# Patient Record
Sex: MALE | Race: BLACK OR AFRICAN AMERICAN | Employment: FULL TIME | URBAN - METROPOLITAN AREA
[De-identification: names, ages, dates, MRNs, and addresses within clinical notes are randomized per-mention and may not be internally consistent; named-entity substitution may affect disease eponyms.]

---

## 2017-10-17 ENCOUNTER — APPOINTMENT (OUTPATIENT)
Dept: GENERAL RADIOLOGY | Facility: CLINIC | Age: 27
End: 2017-10-17
Attending: PHYSICIAN ASSISTANT

## 2017-10-17 ENCOUNTER — HOSPITAL ENCOUNTER (EMERGENCY)
Facility: CLINIC | Age: 27
Discharge: HOME | End: 2017-10-17
Attending: PHYSICIAN ASSISTANT | Admitting: PHYSICIAN ASSISTANT

## 2017-10-17 VITALS
SYSTOLIC BLOOD PRESSURE: 132 MMHG | HEIGHT: 71 IN | TEMPERATURE: 98.7 F | BODY MASS INDEX: 35.7 KG/M2 | RESPIRATION RATE: 16 BRPM | WEIGHT: 255 LBS | DIASTOLIC BLOOD PRESSURE: 90 MMHG | HEART RATE: 75 BPM | OXYGEN SATURATION: 97 %

## 2017-10-17 DIAGNOSIS — J45.901 EXACERBATION OF ASTHMA, UNSPECIFIED ASTHMA SEVERITY, UNSPECIFIED WHETHER PERSISTENT: ICD-10-CM

## 2017-10-17 DIAGNOSIS — J06.9 URI WITH COUGH AND CONGESTION: ICD-10-CM

## 2017-10-17 PROCEDURE — 99284 EMERGENCY DEPT VISIT MOD MDM: CPT | Mod: 25

## 2017-10-17 PROCEDURE — 25000125 ZZHC RX 250: Performed by: PHYSICIAN ASSISTANT

## 2017-10-17 PROCEDURE — 71020 XR CHEST 2 VW: CPT

## 2017-10-17 PROCEDURE — 94640 AIRWAY INHALATION TREATMENT: CPT

## 2017-10-17 RX ORDER — PREDNISONE 20 MG/1
40 TABLET ORAL ONCE
Status: COMPLETED | OUTPATIENT
Start: 2017-10-17 | End: 2017-10-17

## 2017-10-17 RX ORDER — ALBUTEROL SULFATE 0.83 MG/ML
1 SOLUTION RESPIRATORY (INHALATION) EVERY 6 HOURS PRN
Qty: 75 ML | Refills: 0 | Status: SHIPPED | OUTPATIENT
Start: 2017-10-17

## 2017-10-17 RX ORDER — ALBUTEROL SULFATE 90 UG/1
2 AEROSOL, METERED RESPIRATORY (INHALATION) EVERY 6 HOURS PRN
Qty: 1 INHALER | Refills: 0 | Status: SHIPPED | OUTPATIENT
Start: 2017-10-17

## 2017-10-17 RX ORDER — ALBUTEROL SULFATE 0.83 MG/ML
2.5 SOLUTION RESPIRATORY (INHALATION) ONCE
Status: COMPLETED | OUTPATIENT
Start: 2017-10-17 | End: 2017-10-17

## 2017-10-17 RX ORDER — PREDNISONE 20 MG/1
TABLET ORAL
Qty: 10 TABLET | Refills: 0 | Status: SHIPPED | OUTPATIENT
Start: 2017-10-17 | End: 2019-04-27

## 2017-10-17 RX ORDER — IPRATROPIUM BROMIDE AND ALBUTEROL SULFATE 2.5; .5 MG/3ML; MG/3ML
3 SOLUTION RESPIRATORY (INHALATION) ONCE
Status: COMPLETED | OUTPATIENT
Start: 2017-10-17 | End: 2017-10-17

## 2017-10-17 RX ADMIN — PREDNISONE 40 MG: 20 TABLET ORAL at 11:48

## 2017-10-17 RX ADMIN — IPRATROPIUM BROMIDE AND ALBUTEROL SULFATE 3 ML: .5; 3 SOLUTION RESPIRATORY (INHALATION) at 11:48

## 2017-10-17 RX ADMIN — ALBUTEROL SULFATE 2.5 MG: 2.5 SOLUTION RESPIRATORY (INHALATION) at 12:12

## 2017-10-17 ASSESSMENT — ENCOUNTER SYMPTOMS
SHORTNESS OF BREATH: 1
WHEEZING: 1
CHILLS: 0
COUGH: 1
FEVER: 0
SORE THROAT: 0

## 2017-10-17 NOTE — ED PROVIDER NOTES
"  History     Chief Complaint:  Shortness of breath    HPI   Roddy Rodriguez is a 27 year old male with a history of asthma who presents to the ED for evaluation of shortness of breath. The patient states he has had URI symptoms including cough, congestion and runny nose over the past few days, flaring up his asthma. He reports difficulty breathing with wheezing. He has an albuterol inhaler that he has been using without much relief. The patient denies fevers, chills, chest pain, leg swelling or calf pain.     Allergies:  NKDA     Medications:    Albuterol inhaler prn     Past Medical History:    Asthma     Past Surgical History:    History reviewed. No pertinent surgical history.      Family History:    History reviewed. No pertinent family history.     Social History:  Marital Status:   [2]  Never smoker.   Occasional alcohol use.        Review of Systems   Constitutional: Negative for chills and fever.   HENT: Positive for congestion. Negative for sore throat.    Respiratory: Positive for cough, shortness of breath and wheezing.    Cardiovascular: Negative for chest pain and leg swelling.   All other systems reviewed and are negative.      Physical Exam     Patient Vitals for the past 24 hrs:   BP Temp Pulse Resp SpO2 Height Weight   10/17/17 1113 132/90 98.7  F (37.1  C) 75 16 97 % 1.803 m (5' 11\") 115.7 kg (255 lb)           Physical Exam  Nursing note and vitals reviewed.     GENERAL: Alert, mild distress, non toxic appearing.    HEENT: Normal conjunctiva. No scleral icterus. Normal tympanic membranes bilaterally. MMM. No oropharyngeal erythema, no tonsillar edema, no exudate. No firmness or swelling beneath tongue, no elevation of tongue. Uvula midline. No trismus. Tolerating oral secretions without difficulty.   NECK: Supple. No nuchal rigidity. No neck swelling.   CARDIAC: Normal rate and regular rhythm. Normal heart sounds. No murmurs, rubs, or gallops appreciated.   PULMONARY: Faint expiratory " wheezing throughout. Normal breath sounds. No crackles or rhonchi appreciated.  No accessory muscle usage. No stridor. Speaking full sentences without difficulty.   NEURO: Alert and oriented. Non focal.   MUSCULOSKELETAL: Normal range of motion. No peripheral edema. No calf tenderness bilaterally.   SKIN: Skin is warm and dry. No rashes. No pallor or jaundice.   PSYCH: Normal affect and mood.      Emergency Department Course       Imaging:  Radiographic findings were communicated with the patient who voiced understanding of the findings.    CXR:   Negative.   Per radiology.     Interventions:  Duoneb x 1   Albuterol neb x 1   Prednisone 40 mg PO    Emergency Department Course:  The patient arrived in triage where vitals were measured and recorded.   The patient was then escorted back to the emergency department.   The patient's medical records were reviewed.  Nursing notes and vitals were reviewed.    I performed an exam of the patient as documented above. The patient is in agreement with my plan of care.      Patient felt improved following above interventions.     I discussed plan of care with the patient who was in agreement. Patient expressed understanding of the discharge instructions, questions were answered, written instructions provided, and patient discharged in satisfactory condition.      Impression & Plan      Medical Decision Making:  This is a 27-year-old male with a history of asthma who presents with concern for upper respiratory symptoms and worsening asthma.  Here he is afebrile, hematocrit stable and nontoxic appearing.  He does have faint wheezing on exam consistent with an asthma exacerbation triggered by URI.  Chest x-ray without evidence of pneumonia, pleural effusion, pneumothorax or widened mediastinum to suggest dissection.  Low suspicion for PE given his not hypoxic and given his concurrent URI symptoms.  He had some improvement following the above interventions and I feel he is safe for  discharge home.  Will place him on a course of prednisone in addition to albuterol neb and inhaler.  He notes he was previously on a longer term asthma medication as a child. He may require additional longer term management than simply the albuterol. He moved here recently and advised to initiate care with primary care provider. Information for [facility] provided for follow up to ensure improving and to discuss any additional medication management for his asthma if indicated. Reviewed reasons to return to ED, including worsening symptoms, high fevers, leg swelling, or any new concerns. The patient was in agreement with plan and discharged in satisfactory condition with all questions answered.      Diagnosis:    ICD-10-CM    1. URI with cough and congestion J06.9    2. Exacerbation of asthma, unspecified asthma severity, unspecified whether persistent J45.901      Disposition:  Discharge to home    Discharge Medications:  Discharge Medication List as of 10/17/2017  1:06 PM      START taking these medications    Details   predniSONE (DELTASONE) 20 MG tablet Take two tablets (= 40mg) each day for 5 (five) days, Disp-10 tablet, R-0, Local Print      albuterol (PROAIR HFA/PROVENTIL HFA/VENTOLIN HFA) 108 (90 BASE) MCG/ACT Inhaler Inhale 2 puffs into the lungs every 6 hours as needed for shortness of breath / dyspnea or wheezing, Disp-1 Inhaler, R-0, Local Print      albuterol (2.5 MG/3ML) 0.083% neb solution Take 1 vial (2.5 mg) by nebulization every 6 hours as needed for shortness of breath / dyspnea or wheezing, Disp-75 mL, R-0, Local Print               Cynthia Hernandez  10/17/2017    EMERGENCY DEPARTMENT       Cynthia Heranndez PA-C  10/17/17 5425

## 2017-10-17 NOTE — ED AVS SNAPSHOT
Emergency Department    [address] [facility] [address]    Phone:  [phone]    Fax:  [phone]                                       Roddy Rodriguez   MRN: [id number]    Department:   Emergency Department   Date of Visit:  10/17/2017           Patient Information     Date Of Birth          1990        Your diagnoses for this visit were:     URI with cough and congestion     Exacerbation of asthma, unspecified asthma severity, unspecified whether persistent        You were seen by Cynthia Hernandez PA-C.      Follow-up Information     Follow up with  Emergency Department.    Specialty:  EMERGENCY MEDICINE    Why:  If symptoms worsen    Contact information:    [address] [facility] [address] [phone]        Follow up with [facility].    Specialties:  Family Medicine, Internal Medicine    Contact information:    [address] [id number] [phone]        Discharge Instructions       Discharge Instructions  Asthma    Asthma is a condition causing narrowing and inflammation of the airways that can make it hard to breathe.  Asthma can also cause cough, wheezing, noisy breathing and tightness in the chest.  Asthma can be brought on or  triggered  by many things, including dust, mold, pollen, cigarette smoke, exercise, stress and infections (like the common cold).     Generally, every Emergency Department visit should have a follow-up clinic visit with either a primary or a specialty clinic/provider. Please follow-up as instructed by your emergency provider today.    Return to the Emergency Department if:    Your breathing gets worse.    You need to use your inhaler more often than every 4 hours, or cannot get relief from your inhaler.    You are very weak, or feel much more ill.    You develop new symptoms, such as chest pain.    You cough up blood.    You are vomiting (throwing  up) enough that you cannot keep fluids or your medicine down.    What can I do to help myself?    Fill any prescriptions the provider gave you and take them right away--especially antibiotics. Be sure to finish the whole antibiotic prescription.    You may be given a prescription for an inhaler, which can help loosen tight air passages.  Use this as needed, but not more often than directed. Inhalers work much better when used with a spacer.     You may be given a prescription for a steroid to reduce inflammation. Used long-term, these can have some side effects, but for short-term use they are safe. You may notice restlessness or increased appetite (eating more).        You may use non-prescription cough or cold medicines. Cough medicines may help, but do not make the cough go away completely.     Avoid smoke, because this can make you feel worse. If you smoke, this may be a good time to quit! Consider using nicotine lozenges, gum, or patches to reduce cravings.     If you have a fever, Tylenol  (acetaminophen), Motrin  (ibuprofen), or Advil  (ibuprofen), may help bring fever down and may help you feel more comfortable. Be sure to read and follow the package directions, and ask your provider if you have questions.    Be sure to get your flu shot each year.  For certain age groups, the pneumonia shot can help prevent pneumonia.  It is important that you follow up with your regular provider, to be sure that you are improving from this spell (an acute asthma exacerbation), and also to do what you can to keep from having trouble again. Sometimes you need long-term medicines to keep your asthma under control.   If you were given a prescription for medicine here today, be sure to read all of the information (including the package insert) that comes with your prescription.  This will include important information about the medicine, its side effects, and any warnings that you need to know about.  The pharmacist who fills  the prescription can provide more information and answer questions you may have about the medicine.  If you have questions or concerns that the pharmacist cannot address, please call or return to the Emergency Department.   Remember that you can always come back to the Emergency Department if you are not able to see your regular provider in the amount of time listed above, if you get any new symptoms, or if there is anything that worries you.      24 Hour Appointment Hotline       To make an appointment at any [facility], call [phone] ([phone]). If you don't have a family doctor or clinic, we will help you find one. [facility] are conveniently located to serve the needs of you and your family.             Review of your medicines      START taking        Dose / Directions Last dose taken    * albuterol 108 (90 BASE) MCG/ACT Inhaler   Commonly known as:  PROAIR HFA/PROVENTIL HFA/VENTOLIN HFA   Dose:  2 puff   Quantity:  1 Inhaler        Inhale 2 puffs into the lungs every 6 hours as needed for shortness of breath / dyspnea or wheezing   Refills:  0        * albuterol (2.5 MG/3ML) 0.083% neb solution   Dose:  1 vial   Quantity:  75 mL        Take 1 vial (2.5 mg) by nebulization every 6 hours as needed for shortness of breath / dyspnea or wheezing   Refills:  0        predniSONE 20 MG tablet   Commonly known as:  DELTASONE   Quantity:  10 tablet        Take two tablets (= 40mg) each day for 5 (five) days   Refills:  0        * Notice:  This list has 2 medication(s) that are the same as other medications prescribed for you. Read the directions carefully, and ask your doctor or other care provider to review them with you.            Prescriptions were sent or printed at these locations (3 Prescriptions)                   Other Prescriptions                Printed at Department/Unit printer (3 of 3)         predniSONE (DELTASONE) 20 MG tablet               albuterol (PROAIR HFA/PROVENTIL  HFA/VENTOLIN HFA) 108 (90 BASE) MCG/ACT Inhaler               albuterol (2.5 MG/3ML) 0.083% neb solution                Procedures and tests performed during your visit     Chest XR,  PA & LAT      Orders Needing Specimen Collection     None      Pending Results     No orders found from 10/15/2017 to 10/18/2017.            Pending Culture Results     No orders found from 10/15/2017 to 10/18/2017.            Pending Results Instructions     If you had any lab results that were not finalized at the time of your Discharge, you can call the ED Lab Result RN at [phone]. You will be contacted by this team for any positive Lab results or changes in treatment. The nurses are available 7 days a week from 10A to 6:30P.  You can leave a message 24 hours per day and they will return your call.        Test Results From Your Hospital Stay        10/17/2017 12:46 PM      Narrative     XR CHEST 2 VW 10/17/2017 12:44 PM    HISTORY: cough, wheezing        Impression     IMPRESSION: Negative.    KORY FERRARA MD                Clinical Quality Measure: Blood Pressure Screening     Your blood pressure was checked while you were in the emergency department today. The last reading we obtained was  BP: 132/90 . Please read the guidelines below about what these numbers mean and what you should do about them.  If your systolic blood pressure (the top number) is less than 120 and your diastolic blood pressure (the bottom number) is less than 80, then your blood pressure is normal. There is nothing more that you need to do about it.  If your systolic blood pressure (the top number) is 120-139 or your diastolic blood pressure (the bottom number) is 80-89, your blood pressure may be higher than it should be. You should have your blood pressure rechecked within a year by a primary care provider.  If your systolic blood pressure (the top number) is 140 or greater or your diastolic blood pressure (the bottom number) is 90 or greater, you may  "have high blood pressure. High blood pressure is treatable, but if left untreated over time it can put you at risk for heart attack, stroke, or kidney failure. You should have your blood pressure rechecked by a primary care provider within the next 4 weeks.  If your provider in the emergency department today gave you specific instructions to follow-up with your doctor or provider even sooner than that, you should follow that instruction and not wait for up to 4 weeks for your follow-up visit.        Thank you for choosing [facility]       Thank you for choosing [facility] for your care. Our goal is always to provide you with excellent care. Hearing back from our patients is one way we can continue to improve our services. Please take a few minutes to complete the written survey that you may receive in the mail after you visit with us. Thank you!        [employer] Information     [employer] lets you send messages to your doctor, view your test results, renew your prescriptions, schedule appointments and more. To sign up, go to [email or web address] . Click on \"Log in\" on the left side of the screen, which will take you to the Welcome page. Then click on \"Sign up Now\" on the right side of the page.     You will be asked to enter the access code listed below, as well as some personal information. Please follow the directions to create your username and password.     Your access code is: [email or web address]  Expires: 1/15/2018  1:06 PM     Your access code will  in 90 days. If you need help or a new code, please call your [facility] clinic or [phone].        Care EveryWhere ID     This is your Care EveryWhere ID. This could be used by other organizations to access your [facility] medical records  [id number]        Equal Access to Services     NAHED MARCOS : dai [place] qaybta kaalmada adeegyada, waxay idiin hayaan adeeg kharash la'aan ah. So [facility] [phone].    ATENCIÓN: Si melyssa munroe, " tiene a burdick disposición servicios gratuitos de asistencia lingüística. Lljulio c al [phone].    We comply with applicable federal civil rights laws and [place] laws. We do not discriminate on the basis of race, color, national origin, age, disability, sex, sexual orientation, or gender identity.            After Visit Summary       This is your record. Keep this with you and show to your community pharmacist(s) and doctor(s) at your next visit.

## 2017-10-17 NOTE — ED AVS SNAPSHOT
Emergency Department    [address] [place] [address]    Phone:  [phone]    Fax:  [phone]                                       Roddy Rodriguez   MRN: [id number]    Department:   Emergency Department   Date of Visit:  10/17/2017           After Visit Summary Signature Page     I have received my discharge instructions, and my questions have been answered. I have discussed any challenges I see with this plan with the nurse or doctor.    ..........................................................................................................................................  Patient/Patient Representative Signature      ..........................................................................................................................................  Patient Representative Print Name and Relationship to Patient    ..................................................               ................................................  Date                                            Time    ..........................................................................................................................................  Reviewed by Signature/Title    ...................................................              ..............................................  Date                                                            Time

## 2019-04-27 ENCOUNTER — HOSPITAL ENCOUNTER (OUTPATIENT)
Facility: CLINIC | Age: 29
Setting detail: OBSERVATION
Discharge: HOME | End: 2019-04-28
Attending: EMERGENCY MEDICINE | Admitting: INTERNAL MEDICINE
Payer: COMMERCIAL

## 2019-04-27 ENCOUNTER — APPOINTMENT (OUTPATIENT)
Dept: GENERAL RADIOLOGY | Facility: CLINIC | Age: 29
End: 2019-04-27
Attending: EMERGENCY MEDICINE
Payer: COMMERCIAL

## 2019-04-27 DIAGNOSIS — J45.41 MODERATE PERSISTENT ASTHMA WITH ACUTE EXACERBATION: ICD-10-CM

## 2019-04-27 DIAGNOSIS — J01.01 ACUTE RECURRENT MAXILLARY SINUSITIS: Primary | ICD-10-CM

## 2019-04-27 DIAGNOSIS — J45.901 ASTHMA WITH ACUTE EXACERBATION, UNSPECIFIED ASTHMA SEVERITY, UNSPECIFIED WHETHER PERSISTENT: ICD-10-CM

## 2019-04-27 LAB
FLUAV+FLUBV AG SPEC QL: NEGATIVE
FLUAV+FLUBV AG SPEC QL: NEGATIVE
SPECIMEN SOURCE: NORMAL

## 2019-04-27 PROCEDURE — 87804 INFLUENZA ASSAY W/OPTIC: CPT | Performed by: EMERGENCY MEDICINE

## 2019-04-27 PROCEDURE — 25000125 ZZHC RX 250: Performed by: EMERGENCY MEDICINE

## 2019-04-27 PROCEDURE — 25000128 H RX IP 250 OP 636: Performed by: EMERGENCY MEDICINE

## 2019-04-27 PROCEDURE — 96365 THER/PROPH/DIAG IV INF INIT: CPT

## 2019-04-27 PROCEDURE — 99285 EMERGENCY DEPT VISIT HI MDM: CPT | Mod: 25

## 2019-04-27 PROCEDURE — 96375 TX/PRO/DX INJ NEW DRUG ADDON: CPT

## 2019-04-27 PROCEDURE — 25000132 ZZH RX MED GY IP 250 OP 250 PS 637: Performed by: EMERGENCY MEDICINE

## 2019-04-27 PROCEDURE — 94640 AIRWAY INHALATION TREATMENT: CPT

## 2019-04-27 PROCEDURE — 71046 X-RAY EXAM CHEST 2 VIEWS: CPT

## 2019-04-27 PROCEDURE — 99219 ZZC INITIAL OBSERVATION CARE,LEVL II: CPT | Performed by: INTERNAL MEDICINE

## 2019-04-27 RX ORDER — IPRATROPIUM BROMIDE AND ALBUTEROL SULFATE 2.5; .5 MG/3ML; MG/3ML
3 SOLUTION RESPIRATORY (INHALATION) ONCE
Status: COMPLETED | OUTPATIENT
Start: 2019-04-27 | End: 2019-04-27

## 2019-04-27 RX ORDER — METHYLPREDNISOLONE SODIUM SUCCINATE 125 MG/2ML
125 INJECTION, POWDER, LYOPHILIZED, FOR SOLUTION INTRAMUSCULAR; INTRAVENOUS ONCE
Status: COMPLETED | OUTPATIENT
Start: 2019-04-27 | End: 2019-04-27

## 2019-04-27 RX ORDER — CETIRIZINE HYDROCHLORIDE 10 MG/1
10 TABLET ORAL DAILY
COMMUNITY
Start: 2018-12-28

## 2019-04-27 RX ORDER — MONTELUKAST SODIUM 10 MG/1
10 TABLET ORAL AT BEDTIME
COMMUNITY
Start: 2018-12-28 | End: 2019-12-28

## 2019-04-27 RX ORDER — MAGNESIUM SULFATE HEPTAHYDRATE 40 MG/ML
2 INJECTION, SOLUTION INTRAVENOUS ONCE
Status: COMPLETED | OUTPATIENT
Start: 2019-04-27 | End: 2019-04-27

## 2019-04-27 RX ORDER — FLUTICASONE PROPIONATE 50 MCG
2 SPRAY, SUSPENSION (ML) NASAL DAILY
COMMUNITY
Start: 2018-12-28

## 2019-04-27 RX ORDER — HYDROCODONE BITARTRATE AND ACETAMINOPHEN 5; 325 MG/1; MG/1
1 TABLET ORAL ONCE
Status: COMPLETED | OUTPATIENT
Start: 2019-04-27 | End: 2019-04-27

## 2019-04-27 RX ADMIN — HYDROCODONE BITARTRATE AND ACETAMINOPHEN 1 TABLET: 5; 325 TABLET ORAL at 22:38

## 2019-04-27 RX ADMIN — HYDROCODONE BITARTRATE AND ACETAMINOPHEN 1 TABLET: 5; 325 TABLET ORAL at 23:17

## 2019-04-27 RX ADMIN — IPRATROPIUM BROMIDE AND ALBUTEROL SULFATE 3 ML: .5; 3 SOLUTION RESPIRATORY (INHALATION) at 21:48

## 2019-04-27 RX ADMIN — METHYLPREDNISOLONE SODIUM SUCCINATE 125 MG: 125 INJECTION, POWDER, FOR SOLUTION INTRAMUSCULAR; INTRAVENOUS at 21:26

## 2019-04-27 RX ADMIN — IPRATROPIUM BROMIDE AND ALBUTEROL SULFATE 3 ML: .5; 3 SOLUTION RESPIRATORY (INHALATION) at 21:19

## 2019-04-27 RX ADMIN — MAGNESIUM SULFATE HEPTAHYDRATE 2 G: 40 INJECTION, SOLUTION INTRAVENOUS at 22:38

## 2019-04-27 ASSESSMENT — MIFFLIN-ST. JEOR
SCORE: 2126.59
SCORE: 2015.46

## 2019-04-27 ASSESSMENT — ENCOUNTER SYMPTOMS
SINUS PRESSURE: 1
COUGH: 1
WHEEZING: 1
EYE DISCHARGE: 1
SHORTNESS OF BREATH: 1
SINUS PAIN: 1
HEADACHES: 1

## 2019-04-28 VITALS
HEART RATE: 104 BPM | WEIGHT: 224.5 LBS | DIASTOLIC BLOOD PRESSURE: 77 MMHG | TEMPERATURE: 98.8 F | SYSTOLIC BLOOD PRESSURE: 137 MMHG | OXYGEN SATURATION: 94 % | HEIGHT: 71 IN | BODY MASS INDEX: 31.43 KG/M2 | RESPIRATION RATE: 20 BRPM

## 2019-04-28 PROBLEM — J45.901 ACUTE ASTHMA EXACERBATION: Status: ACTIVE | Noted: 2019-04-28

## 2019-04-28 PROCEDURE — 40000275 ZZH STATISTIC RCP TIME EA 10 MIN

## 2019-04-28 PROCEDURE — 25000132 ZZH RX MED GY IP 250 OP 250 PS 637: Performed by: INTERNAL MEDICINE

## 2019-04-28 PROCEDURE — 99217 ZZC OBSERVATION CARE DISCHARGE: CPT | Performed by: HOSPITALIST

## 2019-04-28 PROCEDURE — 25000132 ZZH RX MED GY IP 250 OP 250 PS 637: Performed by: HOSPITALIST

## 2019-04-28 PROCEDURE — G0378 HOSPITAL OBSERVATION PER HR: HCPCS

## 2019-04-28 PROCEDURE — 25000128 H RX IP 250 OP 636: Performed by: INTERNAL MEDICINE

## 2019-04-28 PROCEDURE — 25800030 ZZH RX IP 258 OP 636: Performed by: INTERNAL MEDICINE

## 2019-04-28 PROCEDURE — 96376 TX/PRO/DX INJ SAME DRUG ADON: CPT

## 2019-04-28 PROCEDURE — 94640 AIRWAY INHALATION TREATMENT: CPT

## 2019-04-28 PROCEDURE — 25000125 ZZHC RX 250: Performed by: INTERNAL MEDICINE

## 2019-04-28 RX ORDER — PROCHLORPERAZINE MALEATE 10 MG
10 TABLET ORAL EVERY 6 HOURS PRN
Status: DISCONTINUED | OUTPATIENT
Start: 2019-04-28 | End: 2019-04-28 | Stop reason: HOSPADM

## 2019-04-28 RX ORDER — ALBUTEROL SULFATE 0.83 MG/ML
2.5 SOLUTION RESPIRATORY (INHALATION)
Status: DISCONTINUED | OUTPATIENT
Start: 2019-04-28 | End: 2019-04-28 | Stop reason: HOSPADM

## 2019-04-28 RX ORDER — ONDANSETRON 4 MG/1
4 TABLET, ORALLY DISINTEGRATING ORAL EVERY 6 HOURS PRN
Status: DISCONTINUED | OUTPATIENT
Start: 2019-04-28 | End: 2019-04-28 | Stop reason: HOSPADM

## 2019-04-28 RX ORDER — PREDNISONE 10 MG/1
TABLET ORAL
Qty: 28 TABLET | Refills: 0 | Status: SHIPPED | OUTPATIENT
Start: 2019-04-28 | End: 2019-12-04

## 2019-04-28 RX ORDER — MONTELUKAST SODIUM 10 MG/1
10 TABLET ORAL AT BEDTIME
Status: DISCONTINUED | OUTPATIENT
Start: 2019-04-28 | End: 2019-04-28 | Stop reason: HOSPADM

## 2019-04-28 RX ORDER — PROCHLORPERAZINE 25 MG
25 SUPPOSITORY, RECTAL RECTAL EVERY 12 HOURS PRN
Status: DISCONTINUED | OUTPATIENT
Start: 2019-04-28 | End: 2019-04-28 | Stop reason: HOSPADM

## 2019-04-28 RX ORDER — CETIRIZINE HYDROCHLORIDE 10 MG/1
10 TABLET ORAL DAILY
Status: DISCONTINUED | OUTPATIENT
Start: 2019-04-28 | End: 2019-04-28 | Stop reason: HOSPADM

## 2019-04-28 RX ORDER — LANOLIN ALCOHOL/MO/W.PET/CERES
3 CREAM (GRAM) TOPICAL
Status: DISCONTINUED | OUTPATIENT
Start: 2019-04-28 | End: 2019-04-28 | Stop reason: HOSPADM

## 2019-04-28 RX ORDER — POLYETHYLENE GLYCOL 3350 17 G/17G
17 POWDER, FOR SOLUTION ORAL DAILY PRN
Status: DISCONTINUED | OUTPATIENT
Start: 2019-04-28 | End: 2019-04-28 | Stop reason: HOSPADM

## 2019-04-28 RX ORDER — OXYCODONE HYDROCHLORIDE 5 MG/1
5-10 TABLET ORAL EVERY 4 HOURS PRN
Status: DISCONTINUED | OUTPATIENT
Start: 2019-04-28 | End: 2019-04-28 | Stop reason: HOSPADM

## 2019-04-28 RX ORDER — AMOXICILLIN 250 MG
1 CAPSULE ORAL 2 TIMES DAILY PRN
Status: DISCONTINUED | OUTPATIENT
Start: 2019-04-28 | End: 2019-04-28 | Stop reason: HOSPADM

## 2019-04-28 RX ORDER — ONDANSETRON 2 MG/ML
4 INJECTION INTRAMUSCULAR; INTRAVENOUS EVERY 6 HOURS PRN
Status: DISCONTINUED | OUTPATIENT
Start: 2019-04-28 | End: 2019-04-28 | Stop reason: HOSPADM

## 2019-04-28 RX ORDER — ACETAMINOPHEN 325 MG/1
650 TABLET ORAL EVERY 4 HOURS PRN
Status: DISCONTINUED | OUTPATIENT
Start: 2019-04-28 | End: 2019-04-28 | Stop reason: HOSPADM

## 2019-04-28 RX ORDER — ACETAMINOPHEN 650 MG/1
650 SUPPOSITORY RECTAL EVERY 4 HOURS PRN
Status: DISCONTINUED | OUTPATIENT
Start: 2019-04-28 | End: 2019-04-28 | Stop reason: HOSPADM

## 2019-04-28 RX ORDER — OXYCODONE HYDROCHLORIDE 5 MG/1
5-10 TABLET ORAL EVERY 4 HOURS PRN
Qty: 10 TABLET | Refills: 0 | Status: SHIPPED | OUTPATIENT
Start: 2019-04-28

## 2019-04-28 RX ORDER — BISACODYL 10 MG
10 SUPPOSITORY, RECTAL RECTAL DAILY PRN
Status: DISCONTINUED | OUTPATIENT
Start: 2019-04-28 | End: 2019-04-28 | Stop reason: HOSPADM

## 2019-04-28 RX ORDER — FLUTICASONE PROPIONATE 50 MCG
2 SPRAY, SUSPENSION (ML) NASAL DAILY
Status: DISCONTINUED | OUTPATIENT
Start: 2019-04-28 | End: 2019-04-28 | Stop reason: HOSPADM

## 2019-04-28 RX ORDER — AMOXICILLIN 250 MG
2 CAPSULE ORAL 2 TIMES DAILY PRN
Status: DISCONTINUED | OUTPATIENT
Start: 2019-04-28 | End: 2019-04-28 | Stop reason: HOSPADM

## 2019-04-28 RX ORDER — OXYCODONE HYDROCHLORIDE 5 MG/1
5-10 TABLET ORAL
Status: COMPLETED | OUTPATIENT
Start: 2019-04-28 | End: 2019-04-28

## 2019-04-28 RX ORDER — NALOXONE HYDROCHLORIDE 0.4 MG/ML
.1-.4 INJECTION, SOLUTION INTRAMUSCULAR; INTRAVENOUS; SUBCUTANEOUS
Status: DISCONTINUED | OUTPATIENT
Start: 2019-04-28 | End: 2019-04-28 | Stop reason: HOSPADM

## 2019-04-28 RX ORDER — METHYLPREDNISOLONE SODIUM SUCCINATE 125 MG/2ML
125 INJECTION, POWDER, LYOPHILIZED, FOR SOLUTION INTRAMUSCULAR; INTRAVENOUS EVERY 8 HOURS
Status: DISCONTINUED | OUTPATIENT
Start: 2019-04-28 | End: 2019-04-28 | Stop reason: HOSPADM

## 2019-04-28 RX ORDER — LIDOCAINE 40 MG/G
CREAM TOPICAL
Status: DISCONTINUED | OUTPATIENT
Start: 2019-04-28 | End: 2019-04-28 | Stop reason: HOSPADM

## 2019-04-28 RX ADMIN — OXYCODONE HYDROCHLORIDE 10 MG: 5 TABLET ORAL at 04:23

## 2019-04-28 RX ADMIN — FLUTICASONE FUROATE AND VILANTEROL TRIFENATATE 1 PUFF: 200; 25 POWDER RESPIRATORY (INHALATION) at 08:12

## 2019-04-28 RX ADMIN — OXYCODONE HYDROCHLORIDE 10 MG: 5 TABLET ORAL at 09:26

## 2019-04-28 RX ADMIN — ALBUTEROL SULFATE 2.5 MG: 2.5 SOLUTION RESPIRATORY (INHALATION) at 08:25

## 2019-04-28 RX ADMIN — SODIUM CHLORIDE, POTASSIUM CHLORIDE, SODIUM LACTATE AND CALCIUM CHLORIDE 1000 ML: 600; 310; 30; 20 INJECTION, SOLUTION INTRAVENOUS at 00:05

## 2019-04-28 RX ADMIN — CETIRIZINE HYDROCHLORIDE 10 MG: 10 TABLET, FILM COATED ORAL at 08:12

## 2019-04-28 RX ADMIN — MONTELUKAST SODIUM 10 MG: 10 TABLET, FILM COATED ORAL at 00:25

## 2019-04-28 RX ADMIN — ALBUTEROL SULFATE 2.5 MG: 2.5 SOLUTION RESPIRATORY (INHALATION) at 04:25

## 2019-04-28 RX ADMIN — METHYLPREDNISOLONE SODIUM SUCCINATE 125 MG: 125 INJECTION, POWDER, FOR SOLUTION INTRAMUSCULAR; INTRAVENOUS at 05:47

## 2019-04-28 RX ADMIN — OXYCODONE HYDROCHLORIDE 5 MG: 5 TABLET ORAL at 00:25

## 2019-04-28 RX ADMIN — ALBUTEROL SULFATE 2.5 MG: 2.5 SOLUTION RESPIRATORY (INHALATION) at 00:31

## 2019-04-28 RX ADMIN — OXYCODONE HYDROCHLORIDE 5 MG: 5 TABLET ORAL at 01:27

## 2019-04-28 NOTE — PLAN OF CARE
Arrived to unit 0000. A&Ox4. VSS on RA. Nebs q 2 hours PRN, last neb 0430. Lung sounds wheezy. Sinus pain controlled w/ oxy. LR bolus given (see EMAR). Up Ind. Brett Reg diet. Negative for influenza. Cannot take NSAIDs. Continue to monitor.

## 2019-04-28 NOTE — H&P
[facility]    History and Physical - Hospitalist Service       Date of Admission:  4/27/2019    Assessment & Plan   Roddy Rodriguez is a 28 year old male admitted on 4/27/2019. He presents with shortness of breath, right greater than left with frontal sinus pressure, and wheezing in the setting of asthma and chronic sinusitis.  Improved following treatments in the emergency department, and admitted for observation in the setting of acute asthma exacerbation.    Acute asthma exacerbation: Severe persistent asthma.  Has followed with allergy as well as otolaryngology.  Reportedly, patient was quite tight on initial evaluation in the emergency department despite multiple (9) nebulizer treatments at home.  Improved with IV Solu-Medrol x1, additional nebulizer therapies.  Clear chest x-ray, chills without fevers, suspect viral upper respiratory illness given wife and children recently with similar symptoms.  -Received magnesium in the emergency department  -Every 6 hours albuterol nebulizer treatments ordered scheduled.  Available every 2 hours as needed as well.  -Continue Solu-Medrol 125 mg every 8 hours.  Anticipate transition to prednisone 40 mg daily at discharge for a brief course  -Flu swab to rule out influenza.  -Continue Flonase 2 sprays twice daily  -Continue Brio Ellipta inhaler  -Continue Singulair 10 mg at bedtime    Acute on chronic sinusitis: Anticipate exacerbation related to acute viral upper respiratory illness with wife and children experiencing sinus pressure and cough over the past week.  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  I did confirm this with patient on admission.    Disposition Plan   Expected discharge: Tomorrow, recommended to prior living arrangement once Respiratory status stabilizes with no hypoxia or significant dyspnea.  Entered: Wang Butler MD 04/27/2019, 11:49 PM     The patient's care was discussed with the Patient and Patient's wife at bedside.  Discussed also with Dr. Copeland in the emergency department..    Wang Butler MD  [facility]    ______________________________________________________________________    Chief Complaint   Shortness of breath    History is obtained from patient, patient's wife at bedside, discussion with Dr. Copeland in the emergency department, chart review, review of outside records including recent otolaryngology follow-up.    History of Present Illness   Roddy Rodriguez is a 28 year old male who has a history of environmental allergies, food allergies, nasal polyps, severe persistent asthma and chronic pansinusitis who has been followed by both allergy as well as otolaryngology in the past.  Guy presents today with shortness of breath which has been persisting at home despite 9 nebulizer treatments this evening.  Called into nurse line and was referred to the emergency department.    No hypoxia, and following 1 dose of IV Solu-Medrol as well as DuoNeb administration in the emergency department, patient's breathing has improved.  He was reportedly quite tight on initial examination, though feels that his breathing is better, and though air movement is decreased, patient is not overly wheezy at time of my examination.  At time of my examination, magnesium sulfate is being hung in the IV, though  patient has not yet received this medication.    Patient's primary complaint with his shortness of breath, though he also endorses frontal sinus pressure which he describes as a 10/10 stabbing pain as well as a persistent throbbing pressure sensation.  Has a known history of chronic pansinusitis for which he is following with otolaryngology and there is plan for procedural intervention in the coming weeks.  Historically, he has been treated with steroids for his sinusitis.  Has had interval improvement on serial head CT imaging following steroid doses.  With his nasal polyp, asthma, allergy history he is not a candidate for NSAIDs.    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  There was some thought that his current exacerbation could be related to environmental allergies, though in further discussion with patient and his wife, patient's young children and wife all recently had an upper respiratory illness within the past week involving some sinus congestion, runny nose, cough suggesting an acute viral illness in the family.  No fevers and other family members, and patient has not reported any fevers.  Patient does note, however, that he has had some chills over the past day.  Chest x-ray was clear in the emergency department and patient has not had any sputum production.    On my evaluation, patient actually feels his breathing is better and desires to discharge home with medications for his sinus pressure.  Discussed that he will likely rapidly improve in the setting of acute reversible asthma exacerbation already demonstrating improvement, though there is significant risk for rebound following discontinuation of nebulizer therapies currently provided.  Following a further discussion with Dr. Copeland in the emergency department, patient is amenable to observation admission to monitor respiratory status.    Heart rate in the 110 range.  This is been fairly persistent since admission.   Patient reports that he has not eaten or drank as much as he potentially should have over the course of the day, though largely believes his tachycardia is related to frequent nebulizer treatments.  No chest pain.    Review of Systems    The 10 point Review of Systems is negative other than noted in the HPI or here.    No fevers, some chills  No nausea or vomiting    Past Medical History    I have reviewed this patient's medical history and updated it with pertinent information if needed.   Past Medical History:   Diagnosis Date     Uncomplicated asthma    Chronic pansinusitis    Past Surgical History   I have reviewed this patient's surgical history and updated it with pertinent information if needed.  No pertinent prior surgical history, though anticipated sinus surgery through otolaryngology pending insurance coverage.    Social History   I have reviewed this patient's social history and updated it with pertinent information if needed.  Social History     Tobacco Use     Smoking status: Never Smoker     Smokeless tobacco: Never Used   Substance Use Topics     Alcohol use: Yes     Comment: occ      Drug use: No   Works as a     Family History   I have reviewed this patient's family history and updated it with pertinent information if needed.   3 children recently with upper respiratory illnesses/cold      Prior to Admission Medications   Prior to Admission Medications   Prescriptions Last Dose Informant Patient Reported? Taking?   albuterol (2.5 MG/3ML) 0.083% neb solution 4/27/2019 at PM Self No Yes   Sig: Take 1 vial (2.5 mg) by nebulization every 6 hours as needed for shortness of breath / dyspnea or wheezing   albuterol (PROAIR HFA/PROVENTIL HFA/VENTOLIN HFA) 108 (90 BASE) MCG/ACT Inhaler 4/27/2019 at PM Self No Yes   Sig: Inhale 2 puffs into the lungs every 6 hours as needed for shortness of breath / dyspnea or wheezing   cetirizine (ZYRTEC) 10 MG tablet 4/27/2019 at AM Self Yes Yes   Sig: Take 10  mg by mouth daily    fluticasone (FLONASE) 50 MCG/ACT nasal spray 4/27/2019 at AM Self Yes Yes   Sig: Spray 2 sprays into both nostrils daily    fluticasone-salmeterol (ADVAIR DISKUS) 250-50 MCG/DOSE inhaler 4/27/2019 at AM Self Yes Yes   Sig: Inhale 1 puff into the lungs 2 times daily    montelukast (SINGULAIR) 10 MG tablet 4/26/2019 at PM Self Yes Yes   Sig: Take 10 mg by mouth At Bedtime    sodium chloride-sodium bicarb (CLASSIC NETI POT SINUS WASH) 2300-700 MG KIT 4/27/2019 at AM Self Yes Yes   Sig: rinse sinus twice daily      Facility-Administered Medications: None     Allergies   Allergies   Allergen Reactions     Aspirin Difficulty breathing     Nsaids      Peanuts [Nuts]      Shellfish-Derived Products        Physical Exam   Vital Signs: Temp: 98.6  F (37  C) Temp src: Oral BP: 137/63 Pulse: 104   Resp: 21 SpO2: 91 % O2 Device: None (Room air)    Weight: 249 lbs 0 oz    General Appearance: Muscular 28-year-old -American male sitting comfortably in bed.  Reports 10/10 pain in right frontal sinus, though pain does not appear to be distracting for patient during interview  Eyes: No scleral icterus or injection  HEENT: Normocephalic  Respiratory: Breath sounds with few scattered rhonchi, no wheezes on initial examination in the emergency department prior to magnesium.  No pursed lip breathing or accessory muscle use at this time.  On reassessment of breath sounds approximately 1 hour later, patient now with some scattered wheezing most notable in left base  Cardiovascular: Tachycardic to the 110 range.  Lymph/Hematologic: No lower extremity edema  Skin: No rash  Musculoskeletal: Muscular tone intact  Neurologic: Alert, conversant  Psychiatric: Normal affect, very pleasant    Data   Data reviewed today: I reviewed all medications, new labs and imaging results over the last 24 hours. I personally reviewed the chest x-ray image(s) showing clear lungs.

## 2019-04-28 NOTE — ED NOTES
"[facility]  ED Nurse Handoff Report    ED Chief complaint: Asthma      ED Diagnosis:   Final diagnoses:   Asthma with acute exacerbation, unspecified asthma severity, unspecified whether persistent       Code Status: Full Code    Allergies:   Allergies   Allergen Reactions     Aspirin Difficulty breathing     Nsaids      Peanuts [Nuts]      Shellfish-Derived Products        Activity level - Baseline/Home:  Independent    Activity Level - Current:   Independent     Needed?: No    Isolation: No  Infection: Not Applicable  Bariatric?: No    Vital Signs:   Vitals:    04/27/19 2029 04/27/19 2120 04/27/19 2135   BP: 156/73     Pulse: 107     Resp: 21     Temp: 98.6  F (37  C)     TempSrc: Oral     SpO2: 95% 94% 97%   Weight: 112.9 kg (249 lb)     Height: 1.803 m (5' 11\")         Cardiac Rhythm: ,        Pain level:      Is this patient confused?: No   Does this patient have a guardian?  No         If yes, is there guardianship documents in the Epic \"Code/ACP\" activity?  N/A         Guardian Notified?  N/A  [place] - Suicide Severity Rating Scale Completed?  Yes  If yes, what color did the patient score?  White    Patient Report: Initial Complaint: Asthma  Focused Assessment: Patient present to ED wit Asthma exacerbation after a few days of cold symptoms. Tried home neb treatments with several Albuterol without symptoms relief prompted to come to ED. Patient also reports sinus pressure with headache worsening over last 2 days.     Tests Performed: CXR  Abnormal Results: Labs Ordered and Resulted from Time of ED Arrival Up to the Time of Departure from the ED - No data to display    Treatments provided: Duoneb x 2, Solu-medrol IV, Percocet, Mag IV    Family Comments: wife at bedside    OBS brochure/video discussed/provided to patient/family: No, patient refused              Name of person given brochure if not patient: na              Relationship to patient: na    ED Medications:   Medications "   magnesium sulfate 2 g in water intermittent infusion (has no administration in time range)   HYDROcodone-acetaminophen (NORCO) 5-325 MG per tablet 1 tablet (has no administration in time range)   methylPREDNISolone sodium succinate (solu-MEDROL) injection 125 mg (125 mg Intravenous Given 4/27/19 2126)   ipratropium - albuterol 0.5 mg/2.5 mg/3 mL (DUONEB) neb solution 3 mL (3 mLs Nebulization Given 4/27/19 2119)   ipratropium - albuterol 0.5 mg/2.5 mg/3 mL (DUONEB) neb solution 3 mL (3 mLs Nebulization Given 4/27/19 2148)       Drips infusing?:  No    For the majority of the shift this patient was Green.   Interventions performed were none.    Severe Sepsis OR Septic Shock Diagnosis Present: No    To be done/followed up on inpatient unit:  follow MD order    ED NURSE PHONE NUMBER: [phone]

## 2019-04-28 NOTE — DISCHARGE SUMMARY
[facility]  Hospitalist Discharge Summary       Date of Admission:  4/27/2019  Date of Discharge:  4/28/2019  Discharging Provider: Juan Mcneal MD      Discharge Diagnoses   Acute asthma exacerbation  Acute on chronic sinusitis    Follow-ups Needed After Discharge   Follow-up Appointments     Follow-up and recommended labs and tests       Follow up with primary care provider, Jemima Noguera, as needed for   hospital follow- up.  No follow up labs or test are needed.  Follow up with ENT as currently scheduled.           Discharge Disposition   Discharged to home  Condition at discharge: Stable    Hospital Course   Roddy Rodriguez is a 28 year old male admitted on 4/27/2019. He presents with shortness of breath, right greater than left with frontal sinus pressure, and wheezing in the setting of asthma and chronic sinusitis.  Improved following treatments in the emergency department, and admitted for observation in the setting of acute asthma exacerbation.     Acute asthma exacerbation: Severe persistent asthma.  Has followed with allergy as well as otolaryngology.  Reportedly, patient was quite tight on initial evaluation in the emergency department despite multiple (9) nebulizer treatments at home.  Improved with IV Solu-Medrol x1, additional nebulizer therapies.  Clear chest x-ray, chills without fevers, suspect viral upper respiratory illness given wife and children recently with similar symptoms.  He experienced continued improvement with Solumedrol and nebs and felt well enough to discharge home despite ongoing wheezing at discharge.  Will continue prednisone 40 mg daily x4 days with 1-week taper.  Continue PTA inhalers and follow up with PCP or Allergist as needed.     Acute on chronic sinusitis: Anticipate exacerbation related to acute viral upper respiratory illness with wife and children experiencing sinus pressure and cough over the past week.  Has known chronic sinusitis for which he is  awaiting otolaryngology procedural intervention [facility] including nasal polypectomy, turbinate reduction, anticipated balloon dilation of frontal sinuses.  Continue PTA Flonase and Zyrtec, will discharge with short supply oxycodone for severe sinus pain exacerbated but acute illness.  Follow up with ENT as scheduled.          Consultations This Hospital Stay   None    Code Status   Full Code    Time Spent on this Encounter   I, Juan Mcneal, personally saw the patient today and spent less than or equal to 30 minutes discharging this patient.       Juan Mcneal MD  [facility]  ______________________________________________________________________    Physical Exam   Vital Signs: Temp: 98.8  F (37.1  C) Temp src: Oral BP: 137/77 Pulse: 104 Heart Rate: 89 Resp: 20 SpO2: 94 % O2 Device: None (Room air)    Weight: 224 lbs 8 oz  General Appearance: Well developed, well nourished male in NAD  Respiratory: mild-moderate wheezing diffusely, no tachypnea or increased work of breathing  Cardiovascular: RRR, normal s1/s2 without murmur  GI: abdomen soft, normal bowel sounds  Other: Alert and appropriate, cranial nerves grossly intact        Primary Care Physician   Jemima Noguera    Discharge Orders      Reason for your hospital stay    You were admitted for asthma exacerbation.     Follow-up and recommended labs and tests     Follow up with primary care provider, Jemima Noguera, as needed for hospital follow- up.  No follow up labs or test are needed.  Follow up with ENT as currently scheduled.     Activity    Your activity upon discharge: activity as tolerated     Discharge Instructions    No driving or operating heavy machinery while taking oxycodone.     Full Code     Diet    Follow this diet upon discharge:       Regular Diet Adult       Significant Results and Procedures     Results for orders placed or performed during the hospital encounter of 04/27/19   Chest XR,  PA & LAT     Narrative    CHEST TWO VIEWS 4/27/2019 8:39 PM     HISTORY: Asthma, cough.    COMPARISON: October 17, 2017.     FINDINGS: There are no acute infiltrates. The cardiac silhouette is  not enlarged. Pulmonary vasculature is unremarkable.      Impression    IMPRESSION: No acute disease.     LUIS FELIPE CAR MD       Discharge Medications   Current Discharge Medication List      START taking these medications    Details   oxyCODONE (ROXICODONE) 5 MG tablet Take 1-2 tablets (5-10 mg) by mouth every 4 hours as needed for moderate to severe pain  Qty: 10 tablet, Refills: 0    Associated Diagnoses: Acute recurrent maxillary sinusitis      predniSONE (DELTASONE) 10 MG tablet 4 tabs daily for 4 days, then 3 tabs daily for 2 days, then 2 tabs daily for 2 days, then 1 tab daily for 2 days, then stop.  Qty: 28 tablet, Refills: 0    Associated Diagnoses: Moderate persistent asthma with acute exacerbation         CONTINUE these medications which have NOT CHANGED    Details   albuterol (2.5 MG/3ML) 0.083% neb solution Take 1 vial (2.5 mg) by nebulization every 6 hours as needed for shortness of breath / dyspnea or wheezing  Qty: 75 mL, Refills: 0      albuterol (PROAIR HFA/PROVENTIL HFA/VENTOLIN HFA) 108 (90 BASE) MCG/ACT Inhaler Inhale 2 puffs into the lungs every 6 hours as needed for shortness of breath / dyspnea or wheezing  Qty: 1 Inhaler, Refills: 0      cetirizine (ZYRTEC) 10 MG tablet Take 10 mg by mouth daily       fluticasone (FLONASE) 50 MCG/ACT nasal spray Spray 2 sprays into both nostrils daily       fluticasone-salmeterol (ADVAIR DISKUS) 250-50 MCG/DOSE inhaler Inhale 1 puff into the lungs 2 times daily       montelukast (SINGULAIR) 10 MG tablet Take 10 mg by mouth At Bedtime       sodium chloride-sodium bicarb (CLASSIC NETI POT SINUS WASH) 2300-700 MG KIT rinse sinus twice daily           Allergies   Allergies   Allergen Reactions     Aspirin Difficulty breathing     Nsaids      Peanuts [Nuts]      Shellfish-Derived  Products

## 2019-04-28 NOTE — ED PROVIDER NOTES
History     Chief Complaint:  Asthma      HPI   Roddy Rodriguez is a 28 year old male with a history of asthma who presents to the emergency department for evaluation of asthma. He states that his asthma is acutely worsening. He has been using his treatments at home (9x today), including albuterol, but his asthma continues to worsen. Wife notes he has taken nine treatments today and is still wheezing. In addition, he notes nasal polyps causing significant congestion. Patient also endorses sinus pain, headache, cough, ear pain, congestion, and eye watering. He brings in a CT of his sinuses (see below). He has baseline seasonal allergies, and has taken Singulair and Zyrtec today already. For his pain, patient has been taking Tylenol--wife notes that he is unable to take NSAIDs as use of these has been correlated to his recurrent nasal polyps by his Otolaryngologist. Today, he did do a sinus wash, but this was uncomfortable. Patient was last on Prednisone 2 weeks ago.     CT Fusion Sinus WO IV contrast 04/12/2019  IMPRESSION:   1.  Mildly improved extensive paranasal sinus opacification as above.  2.  Improved now mild opacification in the nasal cavity, predominantly superiorly.     Report per radiology     Allergies:  Aspirin  Nsaids  Peanuts [Nuts]  Shellfish-Derived Products      Medications:    Albuterol inhaler  Cetirizine   Flonase  Advair  Montelukast     Past Medical History:    Asthma   Nasal polyps     Past Surgical History:    History reviewed. No pertinent past surgical history.     Family History:    History reviewed. No pertinent family history.      Social History:  The patient was accompanied to the ED by his wife.  Smoking Status: Never  Smokeless Tobacco: Never  Alcohol Use: Yes  Marital Status:        Review of Systems   HENT: Positive for congestion, ear pain, sinus pressure and sinus pain.    Eyes: Positive for discharge.   Respiratory: Positive for cough, shortness of breath and wheezing.   "  Neurological: Positive for headaches.     10 point review of systems performed and is negative except as above and in HPI.    Physical Exam     Patient Vitals for the past 24 hrs:   BP Temp Temp src Pulse Resp SpO2 Height Weight   04/27/19 2235 137/63 -- -- 104 -- 91 % -- --   04/27/19 2135 -- -- -- -- -- 97 % -- --   04/27/19 2120 -- -- -- -- -- 94 % -- --   04/27/19 2029 156/73 98.6  F (37  C) Oral 107 21 95 % 1.803 m (5' 11\") 112.9 kg (249 lb)        Physical Exam  General: Resting on the gurney, appears uncomfortable  Head:  The scalp, face, and head appear normal  Mouth/Throat: Mucus membranes are moist  Nose:   Edematous nasal mucosa with polyps   Ears:   Fluid behind the TM's bilaterally   CV:  Rapid but regular rate     Normal S1 and S2  No pathological murmur   Resp:  Breath sounds clear and equal bilaterally    Wheezing in all fields    Increased respiratory effort    No coarseness  GI:  Abdomen is soft, no rigidity    No tenderness to palpation  MS:  Normal motor assessment of all extremities.    Good capillary refill noted.  Skin:  No rash or lesions noted.  Neuro:   Speech is normal and fluent. No apparent deficit.  Psych:  Awake. Alert.  Normal affect.      Appropriate interactions    Emergency Department Course     Imaging:  Radiology findings were communicated with the patient and family who voiced understanding of the findings.    CT XR, PA & LAT:  IMPRESSION: No acute disease.   Report per radiology     Interventions:  2119 - DuoNeb 3mL Nebulization    2126 - solu-Medrol 125mg IV   2148 - DuoNeb 3mL Nebulization    2238 - magnesium sulfate 2g IV   2238 - Norco 5-325mg 1 Tablet PO   2317 - Norco 5-325mg 1 Tablet PO     Emergency Department Course:  Nursing notes and vitals reviewed.  The patient was sent for a CT XR, PA & LAT while in the emergency department, results above.     2107: I performed an exam of the patient as documented above.     2213: Patient rechecked and updated.      2233: I " spoke with Dr. Butler of the Hospitalist service regarding patient's presentation, findings, and plan of care.    2256: Patient rechecked and updated.     Findings and plan explained to the Patient and spouse who consents to admission. Discussed the patient with Dr. Butler, who will admit the patient to a Observation bed for further monitoring, evaluation, and treatment.    I personally reviewed the imaging results with the Patient and spouse and answered all related questions prior to admission.    Impression & Plan      Medical Decision Making:  Roddy Rodriguez is a 28 year old male who presents for evaluation of shortness of breath and wheezing, consistent with known reactive airway disease.  Signs and symptoms are consistent with asthma exacerbation. He tried 9 nebs at home prior to presentation without alleviation. He was last on steroids two weeks ago. A broad differential was considered including inhaled foreign body, asthma, pneumonia, bronchitis, COPD, pneumothorax, cardiac equivalent, viral induced wheezing, allergic phenomena, etc.  CXR negative for acute disease. The patient was treated with two more nebs in the emergency department, as well as steroids and magnesium. Despite these interventions, symptoms continued to worsen and patient's wheezes continued to increase in severity on exam. Given significant work of breathing despite multiple attempts to alleviate symptoms, I feel that the patient is appropriate for admission to the observation unit for further work up, treatment and cares. He has remained afebrile, and is otherwise stable. Patient and wife are amenable to admission, and Dr. Butler of the hospitalist service accepts for observation.       Diagnosis:    ICD-10-CM   1. Asthma with acute exacerbation, unspecified asthma severity, unspecified whether persistent J45.901       Disposition:  Admitted to Observation     Scribe Disclosure:  Tennille REINA, am serving as a scribe at 9:07 PM on 4/27/2019  to document services personally performed by Sarah Copeland MD based on my observations and the provider's statements to me.   4/27/2019    EMERGENCY DEPARTMENT       Sarah Copeland MD  04/29/19 0364

## 2019-04-28 NOTE — PHARMACY-ADMISSION MEDICATION HISTORY
Admission medication history interview status for the 4/27/2019  admission is complete. See EPIC admission navigator for prior to admission medications     Medication history source reliability:Good    Actions taken by pharmacist (provider contacted, etc):None     Additional medication history information not noted on PTA med list :None    Medication reconciliation/reorder completed by provider prior to medication history? No    Time spent in this activity: 8 minutes    Prior to Admission medications    Medication Sig Last Dose Taking? Auth Provider   albuterol (2.5 MG/3ML) 0.083% neb solution Take 1 vial (2.5 mg) by nebulization every 6 hours as needed for shortness of breath / dyspnea or wheezing 4/27/2019 at PM Yes Cynthia Hernandez PA-C   albuterol (PROAIR HFA/PROVENTIL HFA/VENTOLIN HFA) 108 (90 BASE) MCG/ACT Inhaler Inhale 2 puffs into the lungs every 6 hours as needed for shortness of breath / dyspnea or wheezing 4/27/2019 at PM Yes Cynthia Hernandez PA-C   cetirizine (ZYRTEC) 10 MG tablet Take 10 mg by mouth daily  4/27/2019 at AM Yes Reported, Patient   fluticasone (FLONASE) 50 MCG/ACT nasal spray Spray 2 sprays into both nostrils daily  4/27/2019 at AM Yes Reported, Patient   fluticasone-salmeterol (ADVAIR DISKUS) 250-50 MCG/DOSE inhaler Inhale 1 puff into the lungs 2 times daily  4/27/2019 at AM Yes Reported, Patient   montelukast (SINGULAIR) 10 MG tablet Take 10 mg by mouth At Bedtime  4/26/2019 at PM Yes Reported, Patient   sodium chloride-sodium bicarb (CLASSIC NETI POT SINUS WASH) 2300-700 MG KIT rinse sinus twice daily 4/27/2019 at AM Yes Unknown, Entered By History

## 2019-04-28 NOTE — PLAN OF CARE
OBSERVATION GOALS    -diagnostic tests and consults completed and resulted: NOT MET  -vital signs normal or at patient baseline: MET  -tolerating oral intake to maintain hydration: MET  -dyspnea improved and O2 sats greater than 88% on room air or prior home oxygen levels: MET  -returns to baseline functional status: NOT MET

## 2019-04-28 NOTE — PLAN OF CARE
Discharge instructions and medications reviewed with patient. IV removed. All belongings sent home with patient. All questions answered. Discharge to door 6.

## 2019-04-28 NOTE — ED TRIAGE NOTES
Cough and cold s/sx with chills since Monday. Asthma worsening, not relieved by at home neb treatments and inhalers. Pt also endorses head /sinus pressure.

## 2019-04-28 NOTE — PLAN OF CARE
OBSERVATION GOALS    -diagnostic tests and consults completed and resulted: MET  -vital signs normal or at patient baseline: MET  -tolerating oral intake to maintain hydration: MET  -dyspnea improved and O2 sats greater than 88% on room air or prior home oxygen levels: MET  -returns to baseline functional status: MET

## 2019-04-28 NOTE — PROGRESS NOTES
.RECEIVING UNIT ED HANDOFF REVIEW    ED Nurse Handoff Report was reviewed by: Osvaldo Bonner on April 27, 2019 at 11:34 PM

## 2019-12-03 ENCOUNTER — HOSPITAL ENCOUNTER (EMERGENCY)
Facility: CLINIC | Age: 29
Discharge: HOME | End: 2019-12-04
Attending: EMERGENCY MEDICINE | Admitting: EMERGENCY MEDICINE
Payer: COMMERCIAL

## 2019-12-03 DIAGNOSIS — J45.901 EXACERBATION OF ASTHMA, UNSPECIFIED ASTHMA SEVERITY, UNSPECIFIED WHETHER PERSISTENT: ICD-10-CM

## 2019-12-03 LAB
ANION GAP SERPL CALCULATED.3IONS-SCNC: 7 MMOL/L (ref 3–14)
BASOPHILS # BLD AUTO: 0 10E9/L (ref 0–0.2)
BASOPHILS NFR BLD AUTO: 0.5 %
BUN SERPL-MCNC: 13 MG/DL (ref 7–30)
CALCIUM SERPL-MCNC: 8.3 MG/DL (ref 8.5–10.1)
CHLORIDE SERPL-SCNC: 110 MMOL/L (ref 94–109)
CO2 SERPL-SCNC: 25 MMOL/L (ref 20–32)
CREAT SERPL-MCNC: 1.36 MG/DL (ref 0.66–1.25)
DIFFERENTIAL METHOD BLD: ABNORMAL
EOSINOPHIL # BLD AUTO: 0.8 10E9/L (ref 0–0.7)
EOSINOPHIL NFR BLD AUTO: 9.7 %
ERYTHROCYTE [DISTWIDTH] IN BLOOD BY AUTOMATED COUNT: 12.9 % (ref 10–15)
GFR SERPL CREATININE-BSD FRML MDRD: 70 ML/MIN/{1.73_M2}
GLUCOSE SERPL-MCNC: 92 MG/DL (ref 70–99)
HCT VFR BLD AUTO: 41.8 % (ref 40–53)
HGB BLD-MCNC: 13.8 G/DL (ref 13.3–17.7)
IMM GRANULOCYTES # BLD: 0 10E9/L (ref 0–0.4)
IMM GRANULOCYTES NFR BLD: 0.2 %
LYMPHOCYTES # BLD AUTO: 2.4 10E9/L (ref 0.8–5.3)
LYMPHOCYTES NFR BLD AUTO: 28.3 %
MCH RBC QN AUTO: 27.8 PG (ref 26.5–33)
MCHC RBC AUTO-ENTMCNC: 33 G/DL (ref 31.5–36.5)
MCV RBC AUTO: 84 FL (ref 78–100)
MONOCYTES # BLD AUTO: 0.8 10E9/L (ref 0–1.3)
MONOCYTES NFR BLD AUTO: 9.9 %
NEUTROPHILS # BLD AUTO: 4.4 10E9/L (ref 1.6–8.3)
NEUTROPHILS NFR BLD AUTO: 51.4 %
NRBC # BLD AUTO: 0 10*3/UL
NRBC BLD AUTO-RTO: 0 /100
PLATELET # BLD AUTO: 241 10E9/L (ref 150–450)
POTASSIUM SERPL-SCNC: 3.9 MMOL/L (ref 3.4–5.3)
RBC # BLD AUTO: 4.97 10E12/L (ref 4.4–5.9)
SODIUM SERPL-SCNC: 142 MMOL/L (ref 133–144)
WBC # BLD AUTO: 8.5 10E9/L (ref 4–11)

## 2019-12-03 PROCEDURE — 99284 EMERGENCY DEPT VISIT MOD MDM: CPT | Mod: 25

## 2019-12-03 PROCEDURE — 25000128 H RX IP 250 OP 636: Performed by: EMERGENCY MEDICINE

## 2019-12-03 PROCEDURE — 25000125 ZZHC RX 250: Performed by: EMERGENCY MEDICINE

## 2019-12-03 PROCEDURE — 80048 BASIC METABOLIC PNL TOTAL CA: CPT | Performed by: EMERGENCY MEDICINE

## 2019-12-03 PROCEDURE — 94640 AIRWAY INHALATION TREATMENT: CPT

## 2019-12-03 PROCEDURE — 85025 COMPLETE CBC W/AUTO DIFF WBC: CPT | Performed by: EMERGENCY MEDICINE

## 2019-12-03 PROCEDURE — 96374 THER/PROPH/DIAG INJ IV PUSH: CPT

## 2019-12-03 RX ORDER — METHYLPREDNISOLONE SODIUM SUCCINATE 125 MG/2ML
125 INJECTION, POWDER, LYOPHILIZED, FOR SOLUTION INTRAMUSCULAR; INTRAVENOUS ONCE
Status: COMPLETED | OUTPATIENT
Start: 2019-12-03 | End: 2019-12-03

## 2019-12-03 RX ORDER — IPRATROPIUM BROMIDE AND ALBUTEROL SULFATE 2.5; .5 MG/3ML; MG/3ML
3 SOLUTION RESPIRATORY (INHALATION) ONCE
Status: COMPLETED | OUTPATIENT
Start: 2019-12-03 | End: 2019-12-03

## 2019-12-03 RX ADMIN — METHYLPREDNISOLONE SODIUM SUCCINATE 125 MG: 125 INJECTION, POWDER, FOR SOLUTION INTRAMUSCULAR; INTRAVENOUS at 23:31

## 2019-12-03 RX ADMIN — IPRATROPIUM BROMIDE AND ALBUTEROL SULFATE 3 ML: .5; 3 SOLUTION RESPIRATORY (INHALATION) at 23:30

## 2019-12-03 ASSESSMENT — ENCOUNTER SYMPTOMS
SHORTNESS OF BREATH: 1
WHEEZING: 1

## 2019-12-03 ASSESSMENT — MIFFLIN-ST. JEOR: SCORE: 2171.48

## 2019-12-03 NOTE — ED AVS SNAPSHOT
Emergency Department  [address] [place] [id number]  Phone:  [phone]  Fax:  [phone]                                    Roddy Rodriguez   MRN: [id number]    Department:   Emergency Department   Date of Visit:  12/3/2019           After Visit Summary Signature Page    I have received my discharge instructions, and my questions have been answered. I have discussed any challenges I see with this plan with the nurse or doctor.    ..........................................................................................................................................  Patient/Patient Representative Signature      ..........................................................................................................................................  Patient Representative Print Name and Relationship to Patient    ..................................................               ................................................  Date                                   Time    ..........................................................................................................................................  Reviewed by Signature/Title    ...................................................              ..............................................  Date                                               Time          22EPIC Rev 08/18        Telephone Encounter by Kym Correa at 10/19/18 08:22 AM     Author:  Kym Correa Service:  (none) Author Type:  Patient      Filed:  10/19/18 08:23 AM Encounter Date:  10/15/2018 Status:  Signed     :  Kym Correa (Patient )            Patient calling to check on status of refill.  PSR called  to check if ready for .[SH1.1M]  Patient notified[SH1.1T] prescription is at [facility] .[SH1.1M]        Revision History        User Key Date/Time User Provider Type Action    > SH1.1 10/19/18 08:23 AM Kym Correa Patient  Sign    M - Manual, T - Template

## 2019-12-03 NOTE — LETTER
December 4, 2019      To Whom It May Concern:      Roddy Rodriguez was seen in our Emergency Department today, 12/04/19.  I expect his condition to improve over the next 2 days.  He may return to work when improved.    Sincerely,        ROWDY Araujo MD

## 2019-12-04 ENCOUNTER — APPOINTMENT (OUTPATIENT)
Dept: GENERAL RADIOLOGY | Facility: CLINIC | Age: 29
End: 2019-12-04
Attending: EMERGENCY MEDICINE
Payer: COMMERCIAL

## 2019-12-04 VITALS
BODY MASS INDEX: 36.4 KG/M2 | WEIGHT: 260 LBS | DIASTOLIC BLOOD PRESSURE: 74 MMHG | RESPIRATION RATE: 18 BRPM | TEMPERATURE: 98.9 F | HEART RATE: 90 BPM | HEIGHT: 71 IN | OXYGEN SATURATION: 99 % | SYSTOLIC BLOOD PRESSURE: 132 MMHG

## 2019-12-04 PROCEDURE — 71046 X-RAY EXAM CHEST 2 VIEWS: CPT

## 2019-12-04 RX ORDER — PREDNISONE 20 MG/1
40 TABLET ORAL DAILY
Qty: 8 TABLET | Refills: 0 | Status: SHIPPED | OUTPATIENT
Start: 2019-12-04 | End: 2019-12-08

## 2019-12-04 NOTE — ED TRIAGE NOTES
Worsening shortness of breath over last 2 days, believes he has asthma exacerbation. Been on inhaler and nebulizer at home but not improving. 2 children at home been sick with pneumonia.

## 2019-12-04 NOTE — ED PROVIDER NOTES
"  History     Chief Complaint:  Shortness of Breath    HPI   Roddy Rodriguez is a 29 year old male with a history of asthma who presents to the emergency department for evaluation of shortness of breath. The patient reports he has experienced around 2.5 days of shortness of breath consistent with his pervious exacerbations of asthma, but more intense than normal. He notes he has chronic sinus congestion. He remarks his children were both recently diagnosed with pneumonia, so he has concern for this as well. He presents to the ED out of concern for his worsening shortness of breath. The patient has used nebulizers at home with no improvement of his symptoms.    Allergies:  Aspirin  Iodine  Nsaids  Peanuts [Nuts]  Shellfish-Derived Products     Medications:    Albuterol  Zyrtec  Singulair  Deltasone    Past Medical History:    Asthma  Sinusitis    Past Surgical History:    The patient does not have any pertinent past surgical history.    Family History:    No past pertinent family history.    Social History:  Presents alone.  Never smoker.  Positive for alcohol use.   Marital Status:   [2]     Review of Systems   HENT: Positive for congestion (chronic).    Respiratory: Positive for shortness of breath and wheezing.    All other systems reviewed and are negative.      Physical Exam     Patient Vitals for the past 24 hrs:   BP Temp Temp src Pulse Heart Rate Resp SpO2 Height Weight   12/04/19 0057 132/74 -- -- -- 89 18 99 % -- --   12/03/19 2335 -- -- -- -- -- -- 99 % -- --   12/03/19 2310 -- -- -- -- -- -- 99 % -- --   12/03/19 2235 -- -- -- -- -- -- 97 % -- --   12/03/19 2232 136/79 98.9  F (37.2  C) Oral 90 -- 18 -- 1.803 m (5' 11\") 117.9 kg (260 lb)     Physical Exam  General: Alert and Interactive.   Head: No signs of trauma.   HENT: Nasal congestion  Mouth/Throat: Oropharynx is clear and moist.   Eyes: Conjunctivae are normal. Pupils are equal, round, and reactive to light.   Neck: Normal range of motion. No " nuchal rigidity.   CV: Normal rate and regular rhythm.    Resp: Expiratory wheezing throughout  GI: Soft. There is no tenderness or guarding.   MSK: Normal range of motion. no edema.   Neuro: The patient is alert and oriented to person, place, and time.  PERRLA, EOMI, strength in upper/lower extremities normal and symmetrical.   Sensation normal. Speech normal.  GCS eye subscore is 4. GCS verbal subscore is 5. GCS motor subscore is 6.   Skin: Skin is warm and dry. No rash noted.   Psych: normal mood and affect. behavior is normal.     Emergency Department Course     Imaging:  Radiology findings were communicated with the patient who voiced understanding of the findings.    XR Chest, PA & LAT:  No evidence of active cardiopulmonary disease.  As per radiology.    Laboratory:  Laboratory findings were communicated with the patient who voiced understanding of the findings.    CBC: WBC: 8.5, HGB: 13.8, PLT: 241  BMP: Chloride 110 (H), Creatinine 1.36 (H), Calcium 8.3 (L), o/w WNL     Interventions:  2330 DuoNeb 3 mL Nebulization   2331 Solu-Medrol 125 mg IV    Emergency Department Course:  Past medical records, nursing notes, and vitals reviewed.    2323 I performed an exam of the patient as documented above.     IV was inserted and blood was drawn for laboratory testing, results above.    The patient was sent for a XR Chest while in the emergency department, results above.     0047 I rechecked the patient and discussed the results of his workup thus far.     Findings and plan explained to the Patient. Patient discharged home with instructions regarding supportive care, medications, and reasons to return. The importance of close follow-up was reviewed.     I personally reviewed the laboratory results with the Patient and answered all related questions prior to discharge.     Impression & Plan     Medical Decision Making:  The patient presents with progressively worsening shortness of breath and a history of asthma. The  patient feels like the symptoms are consistent with an asthma exacerbation.  The differential diagnosis included but was not limited to asthma exacerbation, pneumothorax, pneumonia, PE, bronchitis, allergic reaction. The patient has responded to nebulizer treatments in the ED. The patient has received steroids.  The patient is PERC negative. The chest xray was negative. Laboratory results as noted above were reassuring. The patient is now resting comfortably without signs of respiratory distress. With reasonable clinical certainty I feel that the patient is safe for discharge home for ongoing evaluation and management as an outpatient. The patient understands the importance of returning to the ED with new or worse symptoms, follow up in clinic if not improving as anticipated or otherwise follow up in clinic in 1 week for ongoing evaluation and management.      Diagnosis:    ICD-10-CM    1. Exacerbation of asthma, unspecified asthma severity, unspecified whether persistent J45.901        Disposition:  Discharged to home.    Discharge Medications:  Discharge Medication List as of 12/4/2019 12:51 AM        Scribe Disclosure:  Gato REINA, am serving as a scribe at 11:19 PM on 12/3/2019 to document services personally performed by Evaristo Araujo MD based on my observations and the provider's statements to me.      Evaristo Araujo MD  12/04/19 0127
